# Patient Record
Sex: MALE | Race: WHITE
[De-identification: names, ages, dates, MRNs, and addresses within clinical notes are randomized per-mention and may not be internally consistent; named-entity substitution may affect disease eponyms.]

---

## 2018-04-24 ENCOUNTER — HOSPITAL ENCOUNTER (OUTPATIENT)
Dept: HOSPITAL 84 - D.RAD | Age: 63
Discharge: HOME | End: 2018-04-24
Attending: INTERNAL MEDICINE
Payer: COMMERCIAL

## 2018-04-24 VITALS — BODY MASS INDEX: 18.3 KG/M2

## 2018-04-24 DIAGNOSIS — R14.0: ICD-10-CM

## 2018-04-24 DIAGNOSIS — R11.2: Primary | ICD-10-CM

## 2018-04-24 DIAGNOSIS — R10.13: ICD-10-CM

## 2018-04-24 DIAGNOSIS — K22.9: ICD-10-CM

## 2018-05-24 ENCOUNTER — HOSPITAL ENCOUNTER (OUTPATIENT)
Dept: HOSPITAL 84 - D.OPS | Age: 63
Discharge: HOME | End: 2018-05-24
Attending: INTERNAL MEDICINE
Payer: COMMERCIAL

## 2018-05-24 VITALS — BODY MASS INDEX: 18.32 KG/M2 | HEIGHT: 72 IN | WEIGHT: 135.28 LBS

## 2018-05-24 VITALS — DIASTOLIC BLOOD PRESSURE: 74 MMHG | SYSTOLIC BLOOD PRESSURE: 139 MMHG

## 2018-05-24 DIAGNOSIS — R13.10: Primary | ICD-10-CM

## 2018-05-24 DIAGNOSIS — Z01.812: ICD-10-CM

## 2018-05-24 LAB
ANION GAP SERPL CALC-SCNC: 12.9 MMOL/L (ref 8–16)
BASOPHILS NFR BLD AUTO: 0.1 % (ref 0–2)
BUN SERPL-MCNC: 13 MG/DL (ref 7–18)
CALCIUM SERPL-MCNC: 9.1 MG/DL (ref 8.5–10.1)
CHLORIDE SERPL-SCNC: 104 MMOL/L (ref 98–107)
CO2 SERPL-SCNC: 28.2 MMOL/L (ref 21–32)
CREAT SERPL-MCNC: 0.7 MG/DL (ref 0.6–1.3)
EOSINOPHIL NFR BLD: 0.8 % (ref 0–7)
ERYTHROCYTE [DISTWIDTH] IN BLOOD BY AUTOMATED COUNT: 14.1 % (ref 11.5–14.5)
GLUCOSE SERPL-MCNC: 88 MG/DL (ref 74–106)
HCT VFR BLD CALC: 40.4 % (ref 42–54)
HGB BLD-MCNC: 14 G/DL (ref 13.5–17.5)
IMM GRANULOCYTES NFR BLD: 0.1 % (ref 0–5)
LYMPHOCYTES NFR BLD AUTO: 31.1 % (ref 15–50)
MCH RBC QN AUTO: 31.7 PG (ref 26–34)
MCHC RBC AUTO-ENTMCNC: 34.7 G/DL (ref 31–37)
MCV RBC: 91.6 FL (ref 80–100)
MONOCYTES NFR BLD: 6 % (ref 2–11)
NEUTROPHILS NFR BLD AUTO: 61.9 % (ref 40–80)
OSMOLALITY SERPL CALC.SUM OF ELEC: 279 MOSM/KG (ref 275–300)
PLATELET # BLD: 322 10X3/UL (ref 130–400)
PMV BLD AUTO: 9.2 FL (ref 7.4–10.4)
POTASSIUM SERPL-SCNC: 4.1 MMOL/L (ref 3.5–5.1)
RBC # BLD AUTO: 4.41 10X6/UL (ref 4.2–6.1)
SODIUM SERPL-SCNC: 141 MMOL/L (ref 136–145)
WBC # BLD AUTO: 9.7 10X3/UL (ref 4.8–10.8)

## 2018-11-23 ENCOUNTER — HOSPITAL ENCOUNTER (EMERGENCY)
Dept: HOSPITAL 84 - D.ER | Age: 63
Discharge: HOME | End: 2018-11-23
Payer: COMMERCIAL

## 2018-11-23 VITALS — SYSTOLIC BLOOD PRESSURE: 136 MMHG | DIASTOLIC BLOOD PRESSURE: 88 MMHG

## 2018-11-23 VITALS
HEIGHT: 72 IN | BODY MASS INDEX: 21.04 KG/M2 | WEIGHT: 155.33 LBS | HEIGHT: 72 IN | BODY MASS INDEX: 21.04 KG/M2 | WEIGHT: 155.33 LBS

## 2018-11-23 DIAGNOSIS — I10: ICD-10-CM

## 2018-11-23 DIAGNOSIS — K40.90: Primary | ICD-10-CM

## 2018-11-23 DIAGNOSIS — F17.200: ICD-10-CM

## 2018-11-23 DIAGNOSIS — M35.00: ICD-10-CM

## 2018-11-28 LAB
ANION GAP SERPL CALC-SCNC: 12 MMOL/L (ref 8–16)
BASOPHILS NFR BLD AUTO: 0.1 % (ref 0–2)
BUN SERPL-MCNC: 14 MG/DL (ref 7–18)
CALCIUM SERPL-MCNC: 8.9 MG/DL (ref 8.5–10.1)
CHLORIDE SERPL-SCNC: 105 MMOL/L (ref 98–107)
CO2 SERPL-SCNC: 26.8 MMOL/L (ref 21–32)
CREAT SERPL-MCNC: 0.8 MG/DL (ref 0.6–1.3)
EOSINOPHIL NFR BLD: 0.4 % (ref 0–7)
ERYTHROCYTE [DISTWIDTH] IN BLOOD BY AUTOMATED COUNT: 14.4 % (ref 11.5–14.5)
GLUCOSE SERPL-MCNC: 84 MG/DL (ref 74–106)
HCT VFR BLD CALC: 43.2 % (ref 42–54)
HGB BLD-MCNC: 14.8 G/DL (ref 13.5–17.5)
IMM GRANULOCYTES NFR BLD: 0.3 % (ref 0–5)
LYMPHOCYTES NFR BLD AUTO: 25.2 % (ref 15–50)
MCH RBC QN AUTO: 30.8 PG (ref 26–34)
MCHC RBC AUTO-ENTMCNC: 34.3 G/DL (ref 31–37)
MCV RBC: 90 FL (ref 80–100)
MONOCYTES NFR BLD: 4.2 % (ref 2–11)
NEUTROPHILS NFR BLD AUTO: 69.8 % (ref 40–80)
OSMOLALITY SERPL CALC.SUM OF ELEC: 278 MOSM/KG (ref 275–300)
PLATELET # BLD: 288 10X3/UL (ref 130–400)
PMV BLD AUTO: 9.2 FL (ref 7.4–10.4)
POTASSIUM SERPL-SCNC: 3.8 MMOL/L (ref 3.5–5.1)
RBC # BLD AUTO: 4.8 10X6/UL (ref 4.2–6.1)
SODIUM SERPL-SCNC: 140 MMOL/L (ref 136–145)
WBC # BLD AUTO: 14.6 10X3/UL (ref 4.8–10.8)

## 2018-11-29 ENCOUNTER — HOSPITAL ENCOUNTER (OUTPATIENT)
Dept: HOSPITAL 84 - D.OPS | Age: 63
Discharge: HOME | End: 2018-11-29
Attending: SURGERY
Payer: COMMERCIAL

## 2018-11-29 VITALS — DIASTOLIC BLOOD PRESSURE: 78 MMHG | SYSTOLIC BLOOD PRESSURE: 137 MMHG

## 2018-11-29 VITALS — WEIGHT: 155 LBS | HEIGHT: 71 IN | BODY MASS INDEX: 21.7 KG/M2

## 2018-11-29 DIAGNOSIS — E78.00: ICD-10-CM

## 2018-11-29 DIAGNOSIS — Z01.812: ICD-10-CM

## 2018-11-29 DIAGNOSIS — K40.91: Primary | ICD-10-CM

## 2018-11-29 DIAGNOSIS — K22.0: ICD-10-CM

## 2018-11-29 DIAGNOSIS — M19.90: ICD-10-CM

## 2018-11-29 DIAGNOSIS — I10: ICD-10-CM

## 2020-07-15 ENCOUNTER — HOSPITAL ENCOUNTER (OUTPATIENT)
Dept: HOSPITAL 84 - D.OPS | Age: 65
Discharge: HOME | End: 2020-07-15
Attending: NEUROLOGICAL SURGERY
Payer: COMMERCIAL

## 2020-07-15 VITALS — WEIGHT: 152 LBS | BODY MASS INDEX: 20.59 KG/M2 | HEIGHT: 72 IN

## 2020-07-15 VITALS — SYSTOLIC BLOOD PRESSURE: 163 MMHG | DIASTOLIC BLOOD PRESSURE: 94 MMHG

## 2020-07-15 DIAGNOSIS — I10: ICD-10-CM

## 2020-07-15 DIAGNOSIS — M54.16: ICD-10-CM

## 2020-07-15 DIAGNOSIS — K21.9: ICD-10-CM

## 2020-07-15 DIAGNOSIS — M48.061: Primary | ICD-10-CM

## 2020-07-15 DIAGNOSIS — E78.5: ICD-10-CM

## 2020-07-15 LAB
ERYTHROCYTE [DISTWIDTH] IN BLOOD BY AUTOMATED COUNT: 15.8 % (ref 11.5–14.5)
HCT VFR BLD CALC: 45.2 % (ref 42–54)
HGB BLD-MCNC: 15 G/DL (ref 13.5–17.5)
MCH RBC QN AUTO: 30.4 PG (ref 26–34)
MCHC RBC AUTO-ENTMCNC: 33.2 G/DL (ref 31–37)
MCV RBC: 91.7 FL (ref 80–100)
PLATELET # BLD: 268 10X3/UL (ref 130–400)
PMV BLD AUTO: 8.5 FL (ref 7.4–10.4)
RBC # BLD AUTO: 4.93 10X6/UL (ref 4.2–6.1)
WBC # BLD AUTO: 9.1 10X3/UL (ref 4.8–10.8)

## 2020-07-15 NOTE — OP
PATIENT NAME:  SUDHAKAR GUERRA                     MEDICAL RECORD: [id number]
:59                                             LOCATION:D.OPS          
                                                         ADMISSION DATE:        
SURGEON:  CECILY SAUNDERS MD                 
 
 
DATE OF OPERATION:  07/15/2020
 
SURGEON:  Cecily Saunders MD
 
PREOPERATIVE DIAGNOSIS:  Lumbar spinal stenosis with foraminal stenosis at L4-L5
right and L4-L5 left.
 
PROCEDURE:  Lumbar laminectomy, medial facetectomy and foraminotomy L4-L5 on the
right with sublaminar decompression at L4-L5 on the left with METRx retractor.
 
DESCRIPTION AND TECHNIQUE:  After induction of general endotracheal anesthesia,
the patient was rolled prone on a Bin frame.  Lumbar spine was prepped and
draped in usual sterile fashion.  Fluoroscopic x-ray and spinal needle localized
L4-L5 interspace on the right side.  After infiltration with 1:100,000
epinephrine with 1% lidocaine, a stab incision was created with a #11 blade and
series of dilators were used to advance a METRx retractor to the L4-L5
interspace on the right side.  Levels confirmed with fluoroscopic x-ray.  A
microscope and Midas Ambrocio drill were used to perform a laminectomy, medial
facetectomy, and foraminotomy at L4-L5 on the right.  Hypertrophied ligamentum
flavum was removed with carotid rongeurs.  Next, the spinous process of L4 was
undermined with a Midas-Ambrocio drill.  Hypertrophied ligamentum flavum was removed
with Cloward rongeurs in the central canal as well as on the left side with
Cloward rongeurs under microscopic illumination.  Following this, the
foraminotomy was carried out at L4-L5 on the left with METRx retractor and the
microscope.  The L4 and L5 nerve roots were decompressed on both sides. 
Meticulous hemostasis was maintained throughout the wound.  Wound was irrigated
with copious amounts of Ancef irrigant solution.  Retractors removed.  The
fascia was closed with 2-0 Vicryl suture.  Subdermal layer was closed with 3-0
Vicryl suture.  Skin was closed with staples.  A sterile dressing was applied to
the wound.  The patient was awakened in good condition and taken to recovery. 
All counts were reported as correct.  Estimated blood loss was  minimal.
 
TRANSINT:[id number] Voice Confirmation ID: [id number] DOCUMENT ID: [id number]
                                           
                                           CECILY SAUNDERS MD                 
 
 
 
 
 
 
 
 
 
CC:                                                             [id number]
DICTATION DATE: 20 1818     :     20 0213      [facility] [place] 
                                                                      07/15/20
[place] [id number] [place] [id number]